# Patient Record
Sex: FEMALE | Race: BLACK OR AFRICAN AMERICAN
[De-identification: names, ages, dates, MRNs, and addresses within clinical notes are randomized per-mention and may not be internally consistent; named-entity substitution may affect disease eponyms.]

---

## 2018-06-05 ENCOUNTER — HOSPITAL ENCOUNTER (INPATIENT)
Dept: HOSPITAL 74 - YASAS | Age: 63
LOS: 3 days | Discharge: LEFT BEFORE DISCHARGE | DRG: 894 | End: 2018-06-08
Attending: SURGERY | Admitting: SURGERY
Payer: COMMERCIAL

## 2018-06-05 VITALS — BODY MASS INDEX: 18.5 KG/M2

## 2018-06-05 DIAGNOSIS — F10.230: Primary | ICD-10-CM

## 2018-06-05 DIAGNOSIS — F19.24: ICD-10-CM

## 2018-06-05 DIAGNOSIS — F12.20: ICD-10-CM

## 2018-06-05 DIAGNOSIS — I10: ICD-10-CM

## 2018-06-05 DIAGNOSIS — Z87.898: ICD-10-CM

## 2018-06-05 DIAGNOSIS — R64: ICD-10-CM

## 2018-06-05 DIAGNOSIS — F14.20: ICD-10-CM

## 2018-06-05 DIAGNOSIS — Z91.14: ICD-10-CM

## 2018-06-05 DIAGNOSIS — F32.9: ICD-10-CM

## 2018-06-05 DIAGNOSIS — I45.81: ICD-10-CM

## 2018-06-05 DIAGNOSIS — F17.210: ICD-10-CM

## 2018-06-05 PROCEDURE — HZ2ZZZZ DETOXIFICATION SERVICES FOR SUBSTANCE ABUSE TREATMENT: ICD-10-PCS | Performed by: SURGERY

## 2018-06-05 NOTE — HP
CIWA Score





- CIWA Score


Nausea/Vomitin-No Nausea/No Vomiting


Muscle Tremors: 3


Anxiety: 3


Agitation: 4-Moderately Restless


Paroxysmal Sweats: 3


Orientation: 3-Disoriented Date>2 days


Tacttile Disturbances: 0-None


Auditory Disturbances: 0-None


Visual Disturbances: 0-None


Headache: 3-Moderate


CIWA-Ar Total Score: 19





Admission ROS [facility]





- HPI


Chief Complaint: 





SEEKING DETOX FOR ALCOHOLISM WITH WITHDRAWAL SX'S


Allergies/Adverse Reactions: 


 Allergies











Allergy/AdvReac Type Severity Reaction Status Date / Time


 


No Known Allergies Allergy   Verified 18 19:48











History of Present Illness: 





63 Y.O. FEMLAE WITH LONG HX/O POLYSUBSTANCE ABUSE HERE FOR ALCOHOL DETOX. 

CLIENT IS KNOWN TO THIS PROGRAM. LAST HERE 3/2018. SHE IS SELF REFERRED. 

REPORTS LONGEST CLEAN TIME "ALONG TIME".  DENIES HX/O SEIZURE, SI/HI AND A/V 

HALLUCINATIONS. 


PMHX: HEAD TRAUMA,


PSYCH: 


CLIENT IS PRESENTLY A POOR HISTORIAN DUE TO INTOXICATION 


Exam Limitations: Intoxication





- Ebola screening


Have you traveled outside of the country in the last 21 days: No (N)


Have you had contact with anyone from an Ebola affected area: No


Have you been sick,other than usual withdrawal symptoms: No


Do you have a fever: No





- Review of Systems


Constitutional: Night Sweats


EENT: reports: No Symptoms Reported


Respiratory: reports: No Symptoms reported


Cardiac: reports: No Symptoms Reported


GI: reports: Poor Appetite, Poor Fluid Intake


: reports: Frequency


Musculoskeletal: reports: No Symptoms Reported


Integumentary: reports: No Symptoms Reported


Neuro: reports: No Symptoms reported


Endocrine: reports: No Symptoms Reported


Hematology: reports: No Symptoms Reported


Psychiatric: reports: Agitated, Depressed


Other Systems: Reviewed and Negative





Patient History





- Patient Medical History


Hx Anemia: No


Hx Asthma: No


Hx Chronic Obstructive Pulmonary Disease (COPD): No


Hx Cancer: No


Hx Cardiac Disorders: No


Hx Congestive Heart Failure: No


Hx Hypertension: Yes (Pt is non compliant with meds.)


Hx Hypercholesterolemia: No


Hx Pacemaker: No


HX Cerebrovascular Accident: No


Hx Seizures: No


Hx Diabetes: No


Hx Gastrointestinal Disorders: No


Hx Liver Disease: No


Hx Genitourinary Disorders: No


Hx Sexually Transmitted Disorders: No


Hx Renal Disease (ESRD): No


Hx Thyroid Disease: No


Hx Human Immunodeficiency Virus (HIV): No


Hx Hepatitis C: No


Hx Depression: Yes


Hx Suicide Attempt: No


Hx Bipolar Disorder: No


Hx Schizophrenia: No


Other Medical History: POOR HISTORIAN DUE TO INTOXICATION





- Patient Surgical History


Past Surgical History: Yes


Other Surgical History: 2 BRAIN SX


Anesthesia Reaction: No





- PPD History


Previous Implant?: Yes


Documented Results: Negative w/proof


Implanted On Prior [facility] Admission?: Yes


Date: 03/11/15


Results: 0MM


PPD to be Administered?: Yes





- Reproductive History


Patient is a Female of Child Bearing Age (11 -55 yrs old): No


Patient Pregnant: No ([facility])





- Smoking Cessation


Smoking history: Current every day smoker


Have you smoked in the past 12 months: Yes


Aproximately how many cigarettes per day: 10


Hx Chewing Tobacco Use: No


Initiated information on smoking cessation: Yes


'Breaking Loose' booklet given: 18





- Substance & Tx. History


Hx Alcohol Use: Yes


Hx Substance Use: Yes


Substance Use Type: Alcohol, Cocaine


Hx Substance Use Treatment: Yes ([facility])





- Substances Abused


  ** VODKA


Route: Oral


Frequency: Daily


Amount used: 1 PINT


Age of first use: 32


Date of Last Use: 18





  ** COCAINE


Route: Smoking


Frequency: 3-6 times per week


Amount used: $20


Age of first use: 32


Date of Last Use: 18





Family Disease History





- Family Disease History


Family History: Denies





Admission Physical Exam [facility]





- Vital Signs


Vital Signs: 


 Vital Signs - 24 hr











  18





  19:18


 


Temperature 98.7 F














- Physical


General Appearance: Yes: Appropriately Dressed, Moderate Distress, Alcohol on 

Breath, Intoxicated, Tremorous, Irritable, Other (CRYING)


HEENTM: Yes: EOMI, Normocephalic, BRIANNA, Pharynx Normal, Other (MISSING TEETH)


Respiratory: Yes: Chest Non-Tender, Lungs Clear, Normal Breath Sounds, No 

Respiratory Distress, No Accessory Muscle Use


Neck: Yes: No masses,lesions,Nodules, Supple, Trachea in good position


Breast: Yes: Breast Exam Deferred


Cardiology: Yes: Regular Rhythm, Regular Rate, S1, S2


Abdominal: Yes: Normal Bowel Sounds, Non Tender, Soft, Protuberent


Genitourinary: Yes: Frequency (REPORTS)


Back: Yes: Normal Inspection


Musculoskeletal: Yes: full range of Motion, Gait Steady


Extremities: Yes: Normal Capillary Refill, Normal Range of Motion, Tremors, 

Coldness


Neurological: Yes: Alert, Disoriented (DUE TO INTOXICATION), Depressed Affect


Integumentary: Yes: Cold, Moist


Lymphatic: Yes: Within Normal Limits





- Diagnostic


(1) Alcohol dependence with uncomplicated withdrawal


Current Visit: Yes   Status: Acute   





(2) Substance induced mood disorder


Current Visit: Yes   Status: Suspected   





(3) Cocaine dependence


Current Visit: Yes   Status: Chronic   





(4) HTN (hypertension)


Current Visit: Yes   Status: Chronic   





Cleared for Admission [facility]





- Detox or Rehab


[facility] Level of Care: Medically Managed


Detox Regimen/Protocol: Librium


Claeared for Rehab Admission: No





S Breath Alcohol Content


Breath Alcohol Content: 0.293





Urine Pregancy Test





- Result


Urine Pregnancy Test Results: Negative- NO Line Present





Urine Drug Screen





- Results


Drug Screen Negative: No


Urine Drug Screen Results: SUSANNE-Cocaine

## 2018-06-06 LAB
ALBUMIN SERPL-MCNC: 3.3 G/DL (ref 3.4–5)
ALP SERPL-CCNC: 59 U/L (ref 45–117)
ALT SERPL-CCNC: 27 U/L (ref 12–78)
ANION GAP SERPL CALC-SCNC: 8 MMOL/L (ref 8–16)
APPEARANCE UR: CLEAR
AST SERPL-CCNC: 30 U/L (ref 15–37)
BACTERIA #/AREA URNS HPF: (no result) /HPF
BASOPHILS # BLD: 2.2 % (ref 0–2)
BILIRUB SERPL-MCNC: 0.3 MG/DL (ref 0.2–1)
BILIRUB UR STRIP.AUTO-MCNC: NEGATIVE MG/DL
BUN SERPL-MCNC: 19 MG/DL (ref 7–18)
CALCIUM SERPL-MCNC: 8.2 MG/DL (ref 8.5–10.1)
CHLORIDE SERPL-SCNC: 104 MMOL/L (ref 98–107)
CO2 SERPL-SCNC: 30 MMOL/L (ref 21–32)
COLOR UR: (no result)
CREAT SERPL-MCNC: 1.3 MG/DL (ref 0.55–1.02)
DEPRECATED RDW RBC AUTO: 15.2 % (ref 11.6–15.6)
EOSINOPHIL # BLD: 4.1 % (ref 0–4.5)
EPITH CASTS URNS QL MICRO: (no result) /HPF
GLUCOSE SERPL-MCNC: 89 MG/DL (ref 74–106)
HCT VFR BLD CALC: 31.3 % (ref 32.4–45.2)
HGB BLD-MCNC: 10.4 GM/DL (ref 10.7–15.3)
KETONES UR QL STRIP: NEGATIVE
LEUKOCYTE ESTERASE UR QL STRIP.AUTO: NEGATIVE
LYMPHOCYTES # BLD: 46.6 % (ref 8–40)
MCH RBC QN AUTO: 28.9 PG (ref 25.7–33.7)
MCHC RBC AUTO-ENTMCNC: 33.1 G/DL (ref 32–36)
MCV RBC: 87.1 FL (ref 80–96)
MONOCYTES # BLD AUTO: 12.1 % (ref 3.8–10.2)
MUCOUS THREADS URNS QL MICRO: (no result)
NEUTROPHILS # BLD: 35 % (ref 42.8–82.8)
NITRITE UR QL STRIP: NEGATIVE
PH UR: 7 [PH] (ref 5–8)
PLATELET # BLD AUTO: 291 K/MM3 (ref 134–434)
PMV BLD: 7.1 FL (ref 7.5–11.1)
POTASSIUM SERPLBLD-SCNC: 3.6 MMOL/L (ref 3.5–5.1)
PROT SERPL-MCNC: 7.2 G/DL (ref 6.4–8.2)
PROT UR QL STRIP: (no result)
PROT UR QL STRIP: (no result)
RBC # BLD AUTO: 3.59 M/MM3 (ref 3.6–5.2)
RBC # UR STRIP: NEGATIVE /UL
SODIUM SERPL-SCNC: 142 MMOL/L (ref 136–145)
SP GR UR: 1.01 (ref 1–1.03)
UROBILINOGEN UR STRIP-MCNC: NEGATIVE MG/DL (ref 0.2–1)
WBC # BLD AUTO: 2.6 K/MM3 (ref 4–10)

## 2018-06-06 RX ADMIN — Medication SCH MG: at 22:16

## 2018-06-06 RX ADMIN — NICOTINE SCH: 14 PATCH, EXTENDED RELEASE TRANSDERMAL at 12:02

## 2018-06-06 RX ADMIN — Medication SCH: at 12:02

## 2018-06-06 RX ADMIN — Medication SCH: at 01:29

## 2018-06-06 NOTE — PN
[facility] CIWA





- CIWA Score


Nausea/Vomiting: 3


Muscle Tremors: 3


Anxiety: 3


Agitation: 2


Paroxysmal Sweats: 1-Minimal Palms Moist


Orientation: 0-Oriented


Tacttile Disturbances: 1-Very Mild Itch/Numbness


Auditory Disturbances: 1-Very Mild


Visual Disturbances: 0-None


Headache: 2-Mild


CIWA-Ar Total Score: 16





[facility]HS Progress Note (SOAP)


Subjective: 





alert,irritable,anxious,interrupted sleep,tremor


Objective: 





06/06/18 10:33


 Vital Signs











Temperature  98.2 F   06/06/18 09:33


 


Pulse Rate  93 H  06/06/18 09:33


 


Respiratory Rate  16   06/06/18 09:33


 


Blood Pressure  138/99   06/06/18 09:33


 


O2 Sat by Pulse Oximetry (%)      








ekg nsr 


peak t in v3,v4


prolong qt 430/480


no chest pain,no sob,no dizziness


labs pending


Assessment: 





06/06/18 10:36


withdrawal symptom


Plan: 





continue detox

## 2018-06-06 NOTE — EKG
Test Reason : 

Blood Pressure : ***/*** mmHG

Vent. Rate : 075 BPM     Atrial Rate : 075 BPM

   P-R Int : 172 ms          QRS Dur : 082 ms

    QT Int : 430 ms       P-R-T Axes : 072 013 056 degrees

   QTc Int : 480 ms

 

NORMAL SINUS RHYTHM

ANTEROSEPTAL INFARCT , AGE UNDETERMINED

ABNORMAL ECG

NO PREVIOUS ECGS AVAILABLE

Confirmed by MARIBEL ACEVEDO MD ([id number]) on 6/6/2018 11:53:13 AM

 

Referred By:             Confirmed By:MARIBEL ACEVEDO MD

## 2018-06-06 NOTE — CONSULT
[facility] Psychiatric Consult





- Data


Date of interview: 06/06/18


Admission source: [facility]


Identifying data: This is 63 years old female, divorsed, unemployed, homeless, 

on SSI, with a long history of Alcohol, Cocaine and Nicotine dependence,abuse i 

seeking for detox, selfreferred, reports withdrawal symptoms, poor historian.


Substance Abuse History: Smoking history: Current every day smoker.  Have you 

smoked in the past 12 months: Yes.  Aproximately how many cigarettes per day: 

10.  Hx Chewing Tobacco Use: No.  Initiated information on smoking cessation: 

Yes.  'Breaking Loose' booklet given: 06/05/18.  - Substance & Tx. History.  Hx 

Alcohol Use: Yes.  Hx Substance Use: Yes.  Substance Use Type: Alcohol, 

Cocaine.  Hx Substance Use Treatment: Yes ([facility]).  - Substances Abused.  ** 

VODKA.  Route: Oral.  Frequency: Daily.  Amount used: 1 PINT.  Age of first use

: 32.  Date of Last Use: 06/05/18.  ** COCAINE.  Route: Smoking.  Frequency: 3-

6 times per week.  Amount used: $20.  Age of first use: 32.  Date of Last Use: 

06/03/18


Medical History: History of head injury, HTN


Psychiatric History: Patient reports unclear psychiatric admission, unclear 

suicidal history, reports no medications taking prior to admission, sedated.


Physical/Sexual Abuse/Trauma History: Denies, unclear


Additional Comment: Observation.  Detox Unit Care Protocol





Mental Status Exam





- Mental Status Exam


Alert and Oriented to: Person


Cognitive Function: Fair


Patient Appearance: Unkempt


Mood: Sad


Affect: Flat


Patient Behavior: Sedated


Speech Pattern: Delayed


Voice Loudness: Moderately Soft/Quiet


Thought Process: Circumstantial


Thought Disorder: Being Controlled


Hallucinations: Denies


Suicidal Ideation: Denies


Homicidal Ideation: Denies


Insight/Judgement: Fair


Sleep: Fair


Appetite: Weight loss


Muscle strength/Tone: Moderate Hypotonicity


Gait/Station: Deferred


Additional Comments: Observation.  Detox Unit Care Protocol





Psychiatric Findings





- Problem List (Axis 1, 2,3)


(1) Alcohol dependence with uncomplicated withdrawal


Current Visit: Yes   Status: Acute   





(2) Cocaine dependence


Current Visit: Yes   Status: Chronic   





(3) Substance induced mood disorder


Current Visit: Yes   Status: Suspected   





(4) Alcohol dependence


Current Visit: No   Status: Acute   





(5) Cachexia


Current Visit: No   Status: Acute   





(6) Cannabis dependence


Current Visit: No   Status: Acute   





(7) Depressive disorder


Current Visit: No   Status: Acute   





- Initial Treatment Plan


Initial Treatment Plan: Observation.  Detox Unit Care Protocol

## 2018-06-07 RX ADMIN — Medication SCH MG: at 22:10

## 2018-06-07 RX ADMIN — Medication SCH TAB: at 11:20

## 2018-06-07 RX ADMIN — PANTOPRAZOLE SODIUM SCH MG: 20 TABLET, DELAYED RELEASE ORAL at 11:21

## 2018-06-07 RX ADMIN — METOPROLOL TARTRATE SCH MG: 25 TABLET, FILM COATED ORAL at 11:21

## 2018-06-07 RX ADMIN — METOPROLOL TARTRATE SCH MG: 25 TABLET, FILM COATED ORAL at 22:10

## 2018-06-07 RX ADMIN — NICOTINE SCH: 14 PATCH, EXTENDED RELEASE TRANSDERMAL at 11:21

## 2018-06-07 NOTE — PN
S CIWA





- CIWA Score


Nausea/Vomiting: 3


Muscle Tremors: 3


Anxiety: 2


Agitation: 2


Paroxysmal Sweats: 1-Minimal Palms Moist


Orientation: 0-Oriented


Tacttile Disturbances: 1-Very Mild Itch/Numbness


Auditory Disturbances: 1-Very Mild


Visual Disturbances: 0-None


Headache: 2-Mild


CIWA-Ar Total Score: 15





B[facility] Progress Note (SOAP)


Subjective: 





alert,irritable,anxious,interrupted sleep,tremor


Objective: 





06/07/18 08:53


 Vital Signs











Temperature  97.5 F L  06/07/18 06:00


 


Pulse Rate  70   06/07/18 06:00


 


Respiratory Rate  18   06/07/18 06:00


 


Blood Pressure  148/105   06/07/18 06:00


 


O2 Sat by Pulse Oximetry (%)      








 Laboratory Last Values











WBC  2.6 K/mm3 (4.0-10.0)  L D 06/06/18  07:30    


 


RBC  3.59 M/mm3 (3.60-5.2)  L  06/06/18  07:30    


 


Hgb  10.4 GM/dL (10.7-15.3)  L  06/06/18  07:30    


 


Hct  31.3 % (32.4-45.2)  L  06/06/18  07:30    


 


MCV  87.1 fl (80-96)   06/06/18  07:30    


 


MCH  28.9 pg (25.7-33.7)   06/06/18  07:30    


 


MCHC  33.1 g/dl (32.0-36.0)   06/06/18  07:30    


 


RDW  15.2 % (11.6-15.6)   06/06/18  07:30    


 


Plt Count  291 K/MM3 (134-434)  D 06/06/18  07:30    


 


MPV  7.1 fl (7.5-11.1)  L D 06/06/18  07:30    


 


Absolute Neuts (auto)  0.9 #  06/06/18  07:30    


 


Neutrophils %  35.0 % (42.8-82.8)  L  06/06/18  07:30    


 


Lymphocytes %  46.6 % (8-40)  H  06/06/18  07:30    


 


Monocytes %  12.1 % (3.8-10.2)  H  06/06/18  07:30    


 


Eosinophils %  4.1 % (0-4.5)   06/06/18  07:30    


 


Basophils %  2.2 % (0-2.0)  H  06/06/18  07:30    


 


Nucleated RBC %  0 % (0-0)   06/06/18  07:30    


 


Sodium  142 mmol/L (136-145)   06/06/18  07:30    


 


Potassium  3.6 mmol/L (3.5-5.1)   06/06/18  07:30    


 


Chloride  104 mmol/L ()   06/06/18  07:30    


 


Carbon Dioxide  30 mmol/L (21-32)   06/06/18  07:30    


 


Anion Gap  8  (8-16)   06/06/18  07:30    


 


BUN  19 mg/dL (7-18)  H  06/06/18  07:30    


 


Creatinine  1.3 mg/dL (0.55-1.02)  H  06/06/18  07:30    


 


Creat Clearance w eGFR  41.64  (>60)   06/06/18  07:30    


 


Random Glucose  89 mg/dL ()   06/06/18  07:30    


 


Calcium  8.2 mg/dL (8.5-10.1)  L  06/06/18  07:30    


 


Total Bilirubin  0.3 mg/dL (0.2-1.0)  D 06/06/18  07:30    


 


AST  30 U/L (15-37)   06/06/18  07:30    


 


ALT  27 U/L (12-78)   06/06/18  07:30    


 


Alkaline Phosphatase  59 U/L ()   06/06/18  07:30    


 


Total Protein  7.2 g/dl (6.4-8.2)   06/06/18  07:30    


 


Albumin  3.3 g/dl (3.4-5.0)  L  06/06/18  07:30    


 


Urine Color  Ltyellow   06/06/18  15:00    


 


Urine Appearance  Clear   06/06/18  15:00    


 


Urine pH  7.0  (5.0-8.0)  D 06/06/18  15:00    


 


Ur Specific Gravity  1.011  (1.001-1.035)   06/06/18  15:00    


 


Urine Protein  1+  (NEGATIVE)  H  06/06/18  15:00    


 


Urine Glucose (UA)  2+  (NEGATIVE)  H  06/06/18  15:00    


 


Urine Ketones  Negative  (NEGATIVE)   06/06/18  15:00    


 


Urine Blood  Negative  (NEGATIVE)   06/06/18  15:00    


 


Urine Nitrite  Negative  (NEGATIVE)   06/06/18  15:00    


 


Urine Bilirubin  Negative  (<2.0 mg/dL)   06/06/18  15:00    


 


Urine Urobilinogen  Negative mg/dL (0.2-1.0)   06/06/18  15:00    


 


Ur Leukocyte Esterase  Negative  (NEGATIVE)   06/06/18  15:00    


 


Urine WBC (Auto)  1 /hpf (3-5)   06/06/18  15:00    


 


Urine RBC (Auto)  <1 /hpf (0-3)   06/06/18  15:00    


 


Ur Epithelial Cells  Rare /HPF (FEW)   06/06/18  15:00    


 


Urine Bacteria  Rare /hpf (NONE SEEN)   06/06/18  15:00    


 


Urine Mucus  Rare   06/06/18  15:00    


 


RPR Titer  Nonreactive  (NONREACTIVE)   06/06/18  07:30    











Assessment: 





06/07/18 08:56


withdrawal symptom


Plan: 





continue detox,encourage oral fluid,cbc,cmp in am

## 2018-06-08 VITALS — HEART RATE: 69 BPM | TEMPERATURE: 97 F | DIASTOLIC BLOOD PRESSURE: 92 MMHG | SYSTOLIC BLOOD PRESSURE: 147 MMHG

## 2018-06-08 LAB
ALBUMIN SERPL-MCNC: 3 G/DL (ref 3.4–5)
ALP SERPL-CCNC: 58 U/L (ref 45–117)
ALT SERPL-CCNC: 25 U/L (ref 12–78)
ANION GAP SERPL CALC-SCNC: 5 MMOL/L (ref 8–16)
AST SERPL-CCNC: 23 U/L (ref 15–37)
BILIRUB SERPL-MCNC: 0.3 MG/DL (ref 0.2–1)
BUN SERPL-MCNC: 25 MG/DL (ref 7–18)
CALCIUM SERPL-MCNC: 9 MG/DL (ref 8.5–10.1)
CHLORIDE SERPL-SCNC: 100 MMOL/L (ref 98–107)
CO2 SERPL-SCNC: 30 MMOL/L (ref 21–32)
CREAT SERPL-MCNC: 1.4 MG/DL (ref 0.55–1.02)
DEPRECATED RDW RBC AUTO: 15.3 % (ref 11.6–15.6)
GLUCOSE SERPL-MCNC: 96 MG/DL (ref 74–106)
HCT VFR BLD CALC: 32 % (ref 32.4–45.2)
HGB BLD-MCNC: 10.5 GM/DL (ref 10.7–15.3)
MCH RBC QN AUTO: 28.8 PG (ref 25.7–33.7)
MCHC RBC AUTO-ENTMCNC: 32.9 G/DL (ref 32–36)
MCV RBC: 87.5 FL (ref 80–96)
PLATELET # BLD AUTO: 299 K/MM3 (ref 134–434)
PMV BLD: 7.2 FL (ref 7.5–11.1)
POTASSIUM SERPLBLD-SCNC: 4.2 MMOL/L (ref 3.5–5.1)
PROT SERPL-MCNC: 6.8 G/DL (ref 6.4–8.2)
RBC # BLD AUTO: 3.66 M/MM3 (ref 3.6–5.2)
SODIUM SERPL-SCNC: 135 MMOL/L (ref 136–145)
WBC # BLD AUTO: 3 K/MM3 (ref 4–10)

## 2018-06-08 RX ADMIN — Medication SCH TAB: at 10:30

## 2018-06-08 RX ADMIN — PANTOPRAZOLE SODIUM SCH MG: 20 TABLET, DELAYED RELEASE ORAL at 10:31

## 2018-06-08 RX ADMIN — METOPROLOL TARTRATE SCH MG: 25 TABLET, FILM COATED ORAL at 10:30

## 2018-06-08 RX ADMIN — NICOTINE SCH: 14 PATCH, EXTENDED RELEASE TRANSDERMAL at 10:31

## 2018-06-08 NOTE — PN
[facility] Progress Note (SOAP)


Subjective: 





alert,irritable,anxious,interrupted sleep,pain in the body


Objective: 





06/08/18 11:21


 Vital Signs











Temperature  96.6 F L  06/08/18 06:00


 


Pulse Rate  63   06/08/18 07:15


 


Respiratory Rate  18   06/08/18 07:15


 


Blood Pressure  137/92   06/08/18 07:15


 


O2 Sat by Pulse Oximetry (%)      








 Laboratory Results - last 24 hr











  06/08/18 06/08/18





  07:30 07:30


 


WBC  3.0 L 


 


RBC  3.66 


 


Hgb  10.5 L 


 


Hct  32.0 L 


 


MCV  87.5 


 


MCH  28.8 


 


MCHC  32.9 


 


RDW  15.3 


 


Plt Count  299 


 


MPV  7.2 L 


 


Sodium   135 L


 


Potassium   4.2


 


Chloride   100











Assessment: 





06/08/18 11:22


withdrawal symptom


labs pending


Plan: 





continue detox

## 2018-06-08 NOTE — DS
[facility] Detox Discharge Summary


Admission Date: 


06/05/18





Discharge Date: 06/08/18





- History


Present History: Alcohol Dependence, Cannabis Dependence, Cocaine Dependence


Additional Comments: 





PATIENT DOES NOT WISH TO STAY TO COMPLETE DETOX REGIMEN. RISKS OF LEAVING DETOX 

UNIT AGAINST MEDICAL ADVICE AND PRIOR TO COMPLETION OF DETOX REGIMEN EXPLAINED 

TO PATIENT. PATIENT DECLINED OFFER OF DISCHARGE MEDICATION PRESCRIPTIONS FOR 

HOME MEDICATIONS, NOTING THAT SHE CURRENTLY HAS ADEQUATE SUPPLIES AT HOME. 

PATIENT ADVISED TO GO IMMEDIATELY TO NEAREST ER SHOULD ANY INTOLERABLE DETOX 

SYMPTOMS DEVELOP AT ANY TIME. PATIENT LEFT DETOX UNIT IN STABLE MEDICAL 

CONDITION.


Pertinent Past History: 





Depression, Cachexia, Weight Decreased, HTN.





- Physical Exam Results


Vital Signs: 


 Vital Signs











Temperature  97.0 F L  06/08/18 17:43


 


Pulse Rate  69   06/08/18 17:43


 


Respiratory Rate  18   06/08/18 17:43


 


Blood Pressure  147/92   06/08/18 17:43


 


O2 Sat by Pulse Oximetry (%)      











Pertinent Admission Physical Exam Findings: 





WITHDRAWAL SYMPTOMS.





 Laboratory Tests











  06/06/18 06/06/18 06/06/18





  07:30 07:30 07:30


 


WBC    2.6 L D


 


RBC    3.59 L


 


Hgb    10.4 L


 


Hct    31.3 L


 


MCV    87.1


 


MCH    28.9


 


MCHC    33.1


 


RDW    15.2


 


Plt Count    291  D


 


MPV    7.1 L D


 


Absolute Neuts (auto)    0.9


 


Neutrophils %    35.0 L


 


Lymphocytes %    46.6 H


 


Monocytes %    12.1 H


 


Eosinophils %    4.1


 


Basophils %    2.2 H


 


Nucleated RBC %    0


 


Sodium  142  


 


Potassium  3.6  


 


Chloride  104  


 


Carbon Dioxide  30  


 


Anion Gap  8  


 


BUN  19 H  


 


Creatinine  1.3 H  


 


Creat Clearance w eGFR  41.64  


 


Random Glucose  89  


 


Calcium  8.2 L  


 


Total Bilirubin  0.3  D  


 


AST  30  


 


ALT  27  


 


Alkaline Phosphatase  59  


 


Total Protein  7.2  


 


Albumin  3.3 L  


 


Urine Color   


 


Urine Appearance   


 


Urine pH   


 


Ur Specific Gravity   


 


Urine Protein   


 


Urine Glucose (UA)   


 


Urine Ketones   


 


Urine Blood   


 


Urine Nitrite   


 


Urine Bilirubin   


 


Urine Urobilinogen   


 


Ur Leukocyte Esterase   


 


Urine WBC (Auto)   


 


Urine RBC (Auto)   


 


Ur Epithelial Cells   


 


Urine Bacteria   


 


Urine Mucus   


 


RPR Titer   Nonreactive 














  06/06/18 06/08/18 06/08/18





  15:00 07:30 07:30


 


WBC   3.0 L 


 


RBC   3.66 


 


Hgb   10.5 L 


 


Hct   32.0 L 


 


MCV   87.5 


 


MCH   28.8 


 


MCHC   32.9 


 


RDW   15.3 


 


Plt Count   299 


 


MPV   7.2 L 


 


Absolute Neuts (auto)   


 


Neutrophils %   


 


Lymphocytes %   


 


Monocytes %   


 


Eosinophils %   


 


Basophils %   


 


Nucleated RBC %   


 


Sodium    135 L


 


Potassium    4.2


 


Chloride    100


 


Carbon Dioxide    30


 


Anion Gap    5 L


 


BUN    25 H


 


Creatinine    1.4 H


 


Creat Clearance w eGFR    38.23


 


Random Glucose    96


 


Calcium    9.0


 


Total Bilirubin    0.3


 


AST    23


 


ALT    25


 


Alkaline Phosphatase    58


 


Total Protein    6.8


 


Albumin    3.0 L


 


Urine Color  Ltyellow  


 


Urine Appearance  Clear  


 


Urine pH  7.0  D  


 


Ur Specific [place]  1.011  


 


Urine Protein  1+ H  


 


Urine Glucose (UA)  2+ H  


 


Urine Ketones  Negative  


 


Urine Blood  Negative  


 


Urine Nitrite  Negative  


 


Urine Bilirubin  Negative  


 


Urine Urobilinogen  Negative  


 


Ur Leukocyte Esterase  Negative  


 


Urine WBC (Auto)  1  


 


Urine RBC (Auto)  <1  


 


Ur Epithelial Cells  Rare  


 


Urine Bacteria  Rare  


 


Urine Mucus  Rare  


 


RPR Titer   








LABS NOTED.





- Treatment


Hospital Course: Detoxed Safely





- Medication


Discharge Medications: 


Ambulatory Orders





Metoprolol Tartrate [Lopressor -] 25 mg PO BID #60 tablet 03/13/15 


Pantoprazole Sodium [Protonix -] 20 mg PO DAILY #30 tablet.ec 03/13/15 


Thiamine HCl [Vitamin B1 -] 100 mg PO HS #30 tablet 03/13/15 











- Diagnosis


(1) Alcohol dependence with uncomplicated withdrawal


Current Visit: Yes   Status: Acute   





(2) Cocaine dependence


Current Visit: Yes   Status: Chronic   


Qualifiers: 


   Substance use status: uncomplicated   Qualified Code(s): F14.20 - Cocaine 

dependence, uncomplicated   





(3) HTN (hypertension)


Current Visit: Yes   Status: Chronic   


Qualifiers: 


   Hypertension type: unspecified   Qualified Code(s): I10 - Essential (primary

) hypertension   





(4) Substance induced mood disorder


Current Visit: Yes   Status: Suspected   





(5) Cachexia


Current Visit: Yes   Status: Acute   





(6) Cannabis dependence


Current Visit: Yes   Status: Acute   





(7) Depressive disorder


Current Visit: Yes   Status: Acute   





(8) History of hypertension


Current Visit: Yes   Status: Acute   





(9) Weight decreased


Current Visit: Yes   Status: Acute   





- AMA


Did Patient Leave Against Medical Advice: Yes (PATIENT DID NOT WISH TO STAY TO 

COMPLETE DETOX REGIMEN.)

## 2021-05-19 ENCOUNTER — HOSPITAL ENCOUNTER (INPATIENT)
Dept: HOSPITAL 74 - YASAS | Age: 66
LOS: 4 days | Discharge: HOME | DRG: 897 | End: 2021-05-23
Attending: ALLERGY & IMMUNOLOGY | Admitting: ALLERGY & IMMUNOLOGY
Payer: COMMERCIAL

## 2021-05-19 VITALS — BODY MASS INDEX: 17.9 KG/M2

## 2021-05-19 DIAGNOSIS — R26.89: ICD-10-CM

## 2021-05-19 DIAGNOSIS — F17.210: ICD-10-CM

## 2021-05-19 DIAGNOSIS — R41.3: ICD-10-CM

## 2021-05-19 DIAGNOSIS — F14.20: ICD-10-CM

## 2021-05-19 DIAGNOSIS — N18.9: ICD-10-CM

## 2021-05-19 DIAGNOSIS — I12.9: ICD-10-CM

## 2021-05-19 DIAGNOSIS — F10.230: Primary | ICD-10-CM

## 2021-05-19 LAB
ALBUMIN SERPL-MCNC: 4 G/DL (ref 3.4–5)
ALP SERPL-CCNC: 71 U/L (ref 45–117)
ALT SERPL-CCNC: 19 U/L (ref 13–61)
ANION GAP SERPL CALC-SCNC: 9 MMOL/L (ref 8–16)
AST SERPL-CCNC: 30 U/L (ref 15–37)
BILIRUB SERPL-MCNC: 0.6 MG/DL (ref 0.2–1)
BUN SERPL-MCNC: 31.5 MG/DL (ref 7–18)
CALCIUM SERPL-MCNC: 10.1 MG/DL (ref 8.5–10.1)
CHLORIDE SERPL-SCNC: 100 MMOL/L (ref 98–107)
CO2 SERPL-SCNC: 27 MMOL/L (ref 21–32)
CREAT SERPL-MCNC: 2.3 MG/DL (ref 0.55–1.3)
DEPRECATED RDW RBC AUTO: 18 % (ref 11.6–15.6)
GLUCOSE SERPL-MCNC: 94 MG/DL (ref 74–106)
HCT VFR BLD CALC: 33.6 % (ref 32.4–45.2)
HGB BLD-MCNC: 11 GM/DL (ref 10.7–15.3)
MCH RBC QN AUTO: 28 PG (ref 25.7–33.7)
MCHC RBC AUTO-ENTMCNC: 32.8 G/DL (ref 32–36)
MCV RBC: 85.5 FL (ref 80–96)
PLATELET # BLD AUTO: 354 K/MM3 (ref 134–434)
PMV BLD: 8.2 FL (ref 7.5–11.1)
PROT SERPL-MCNC: 8.2 G/DL (ref 6.4–8.2)
RBC # BLD AUTO: 3.93 M/MM3 (ref 3.6–5.2)
SODIUM SERPL-SCNC: 136 MMOL/L (ref 136–145)
WBC # BLD AUTO: 3.7 K/MM3 (ref 4–10)

## 2021-05-19 PROCEDURE — HZ2ZZZZ DETOXIFICATION SERVICES FOR SUBSTANCE ABUSE TREATMENT: ICD-10-PCS | Performed by: ALLERGY & IMMUNOLOGY

## 2021-05-19 PROCEDURE — U0005 INFEC AGEN DETEC AMPLI PROBE: HCPCS

## 2021-05-19 PROCEDURE — U0003 INFECTIOUS AGENT DETECTION BY NUCLEIC ACID (DNA OR RNA); SEVERE ACUTE RESPIRATORY SYNDROME CORONAVIRUS 2 (SARS-COV-2) (CORONAVIRUS DISEASE [COVID-19]), AMPLIFIED PROBE TECHNIQUE, MAKING USE OF HIGH THROUGHPUT TECHNOLOGIES AS DESCRIBED BY CMS-2020-01-R: HCPCS

## 2021-05-19 PROCEDURE — C9803 HOPD COVID-19 SPEC COLLECT: HCPCS

## 2021-05-19 RX ADMIN — PANTOPRAZOLE SODIUM SCH MG: 40 TABLET, DELAYED RELEASE ORAL at 22:27

## 2021-05-19 RX ADMIN — HYDROXYZINE PAMOATE SCH: 25 CAPSULE ORAL at 22:30

## 2021-05-19 RX ADMIN — Medication SCH MG: at 22:26

## 2021-05-19 RX ADMIN — NICOTINE SCH MG: 7 PATCH TRANSDERMAL at 15:18

## 2021-05-19 RX ADMIN — HYDROXYZINE PAMOATE SCH MG: 25 CAPSULE ORAL at 15:18

## 2021-05-19 RX ADMIN — HYDROXYZINE PAMOATE SCH MG: 25 CAPSULE ORAL at 17:47

## 2021-05-19 RX ADMIN — METOPROLOL TARTRATE SCH MG: 25 TABLET, FILM COATED ORAL at 22:27

## 2021-05-20 RX ADMIN — PANTOPRAZOLE SODIUM SCH MG: 40 TABLET, DELAYED RELEASE ORAL at 10:40

## 2021-05-20 RX ADMIN — NICOTINE SCH: 7 PATCH TRANSDERMAL at 10:41

## 2021-05-20 RX ADMIN — HYDROXYZINE PAMOATE SCH: 25 CAPSULE ORAL at 10:41

## 2021-05-20 RX ADMIN — HYDROXYZINE PAMOATE SCH MG: 25 CAPSULE ORAL at 06:43

## 2021-05-20 RX ADMIN — METOPROLOL TARTRATE SCH MG: 25 TABLET, FILM COATED ORAL at 22:27

## 2021-05-20 RX ADMIN — HYDROXYZINE PAMOATE SCH: 25 CAPSULE ORAL at 13:07

## 2021-05-20 RX ADMIN — Medication SCH MG: at 22:27

## 2021-05-20 RX ADMIN — Medication SCH TAB: at 10:40

## 2021-05-20 RX ADMIN — METOPROLOL TARTRATE SCH MG: 25 TABLET, FILM COATED ORAL at 10:41

## 2021-05-21 RX ADMIN — NICOTINE SCH: 7 PATCH TRANSDERMAL at 10:52

## 2021-05-21 RX ADMIN — PANTOPRAZOLE SODIUM SCH MG: 20 TABLET, DELAYED RELEASE ORAL at 10:51

## 2021-05-21 RX ADMIN — METOPROLOL TARTRATE SCH MG: 25 TABLET, FILM COATED ORAL at 10:51

## 2021-05-21 RX ADMIN — Medication SCH MG: at 22:13

## 2021-05-21 RX ADMIN — Medication SCH TAB: at 10:51

## 2021-05-21 RX ADMIN — METOPROLOL TARTRATE SCH MG: 25 TABLET, FILM COATED ORAL at 22:13

## 2021-05-22 RX ADMIN — Medication SCH TAB: at 10:24

## 2021-05-22 RX ADMIN — HYDROXYZINE PAMOATE PRN MG: 25 CAPSULE ORAL at 22:58

## 2021-05-22 RX ADMIN — Medication SCH MG: at 22:59

## 2021-05-22 RX ADMIN — METOPROLOL TARTRATE SCH MG: 25 TABLET, FILM COATED ORAL at 10:25

## 2021-05-22 RX ADMIN — NICOTINE SCH: 7 PATCH TRANSDERMAL at 10:24

## 2021-05-22 RX ADMIN — HYDROXYZINE PAMOATE PRN MG: 25 CAPSULE ORAL at 17:59

## 2021-05-22 RX ADMIN — PANTOPRAZOLE SODIUM SCH MG: 20 TABLET, DELAYED RELEASE ORAL at 10:25

## 2021-05-22 RX ADMIN — METOPROLOL TARTRATE SCH MG: 25 TABLET, FILM COATED ORAL at 22:58

## 2021-05-22 RX ADMIN — Medication SCH MG: at 22:58

## 2021-05-23 VITALS — HEART RATE: 79 BPM | DIASTOLIC BLOOD PRESSURE: 67 MMHG | SYSTOLIC BLOOD PRESSURE: 128 MMHG | TEMPERATURE: 96.4 F

## 2021-05-23 RX ADMIN — METOPROLOL TARTRATE SCH MG: 25 TABLET, FILM COATED ORAL at 09:35

## 2021-05-23 RX ADMIN — NICOTINE SCH: 7 PATCH TRANSDERMAL at 09:35

## 2021-05-23 RX ADMIN — PANTOPRAZOLE SODIUM SCH MG: 20 TABLET, DELAYED RELEASE ORAL at 09:35

## 2021-05-23 RX ADMIN — Medication SCH TAB: at 09:35

## 2022-05-30 ENCOUNTER — OUT OF OFFICE VISIT (OUTPATIENT)
Dept: URBAN - METROPOLITAN AREA MEDICAL CENTER 39 | Facility: MEDICAL CENTER | Age: 67
End: 2022-05-30
Payer: SELF-PAY

## 2022-05-30 DIAGNOSIS — D64.89 ANEMIA DUE TO OTHER CAUSE: ICD-10-CM

## 2022-05-30 DIAGNOSIS — R19.5 ABNORMAL CONSISTENCY OF STOOL: ICD-10-CM

## 2022-05-30 PROCEDURE — 99255 IP/OBS CONSLTJ NEW/EST HI 80: CPT | Performed by: INTERNAL MEDICINE

## 2022-05-31 ENCOUNTER — OUT OF OFFICE VISIT (OUTPATIENT)
Dept: URBAN - METROPOLITAN AREA MEDICAL CENTER 39 | Facility: MEDICAL CENTER | Age: 67
End: 2022-05-31
Payer: SELF-PAY

## 2022-05-31 DIAGNOSIS — D64.89 ANEMIA DUE TO OTHER CAUSE: ICD-10-CM

## 2022-05-31 PROCEDURE — 99233 SBSQ HOSP IP/OBS HIGH 50: CPT | Performed by: INTERNAL MEDICINE

## 2023-08-13 ENCOUNTER — HOSPITAL ENCOUNTER (EMERGENCY)
Dept: HOSPITAL 74 - JER | Age: 68
Discharge: HOME | End: 2023-08-13
Payer: COMMERCIAL

## 2023-08-13 VITALS
DIASTOLIC BLOOD PRESSURE: 99 MMHG | TEMPERATURE: 98 F | RESPIRATION RATE: 16 BRPM | SYSTOLIC BLOOD PRESSURE: 190 MMHG | HEART RATE: 82 BPM

## 2023-08-13 VITALS — BODY MASS INDEX: 22.3 KG/M2

## 2023-08-13 DIAGNOSIS — S09.90XA: Primary | ICD-10-CM
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
## 2023-08-13 DIAGNOSIS — I10: ICD-10-CM

## 2023-08-13 DIAGNOSIS — R51.9: ICD-10-CM

## 2023-08-13 DIAGNOSIS — R53.83: ICD-10-CM

## 2023-08-13 DIAGNOSIS — R41.82: ICD-10-CM

## 2023-08-13 DIAGNOSIS — E16.2: ICD-10-CM

## 2023-08-13 DIAGNOSIS — R40.0: ICD-10-CM

## 2023-08-13 LAB
ALBUMIN SERPL-MCNC: 3 G/DL (ref 3.4–5)
ALP SERPL-CCNC: 77 U/L (ref 45–117)
ALT SERPL-CCNC: 18 U/L (ref 13–61)
ANION GAP SERPL CALC-SCNC: 5 MMOL/L (ref 8–16)
AST SERPL-CCNC: 20 U/L (ref 15–37)
BASE EXCESS BLDV CALC-SCNC: 1.8 MMOL/L (ref -2–2)
BASOPHILS # BLD: 1.2 % (ref 0–2)
BILIRUB SERPL-MCNC: 0.3 MG/DL (ref 0.2–1)
BUN SERPL-MCNC: 28.5 MG/DL (ref 7–18)
CALCIUM SERPL-MCNC: 8.8 MG/DL (ref 8.5–10.1)
CHLORIDE SERPL-SCNC: 108 MMOL/L (ref 98–107)
CO2 SERPL-SCNC: 30 MMOL/L (ref 21–32)
CREAT SERPL-MCNC: 2.5 MG/DL (ref 0.55–1.3)
DEPRECATED RDW RBC AUTO: 18.8 % (ref 11.6–15.6)
EOSINOPHIL # BLD: 2.4 % (ref 0–4.5)
GLUCOSE SERPL-MCNC: 115 MG/DL (ref 74–106)
HCT VFR BLD CALC: 32.4 % (ref 32.4–45.2)
HCT VFR BLDV CALC: 31 % (ref 32.4–45.2)
HGB BLD-MCNC: 10.5 GM/DL (ref 10.7–15.3)
LYMPHOCYTES # BLD: 23.5 % (ref 8–40)
MAGNESIUM SERPL-MCNC: 1.9 MG/DL (ref 1.8–2.4)
MCH RBC QN AUTO: 27.4 PG (ref 25.7–33.7)
MCHC RBC AUTO-ENTMCNC: 32.4 G/DL (ref 32–36)
MCV RBC: 84.8 FL (ref 80–96)
MONOCYTES # BLD AUTO: 8.2 % (ref 3.8–10.2)
NEUTROPHILS # BLD: 64.7 % (ref 42.8–82.8)
PCO2 BLDV: 50.8 MMHG (ref 38–52)
PH BLDV: 7.36 [PH] (ref 7.31–7.41)
PHOSPHATE SERPL-MCNC: 3.6 MG/DL (ref 2.5–4.9)
PLATELET # BLD AUTO: 379 10^3/UL (ref 134–434)
PMV BLD: 8.2 FL (ref 7.5–11.1)
POTASSIUM SERPLBLD-SCNC: 4.2 MMOL/L (ref 3.5–5.1)
PROT SERPL-MCNC: 6.8 G/DL (ref 6.4–8.2)
RBC # BLD AUTO: 3.82 M/MM3 (ref 3.6–5.2)
SAO2 % BLDV: 50.5 % (ref 70–80)
SODIUM SERPL-SCNC: 143 MMOL/L (ref 136–145)
WBC # BLD AUTO: 8 K/MM3 (ref 4–10)

## 2024-08-29 ENCOUNTER — HOSPITAL ENCOUNTER (INPATIENT)
Dept: HOSPITAL 74 - YASAS | Age: 69
LOS: 5 days | Discharge: HOME | DRG: 895 | End: 2024-09-03
Attending: PSYCHIATRY & NEUROLOGY | Admitting: PSYCHIATRY & NEUROLOGY
Payer: COMMERCIAL

## 2024-08-29 VITALS — BODY MASS INDEX: 14.1 KG/M2

## 2024-08-29 DIAGNOSIS — F10.20: Primary | ICD-10-CM

## 2024-08-29 DIAGNOSIS — N18.4: ICD-10-CM

## 2024-08-29 DIAGNOSIS — F19.24: ICD-10-CM

## 2024-08-29 DIAGNOSIS — I12.9: ICD-10-CM

## 2024-08-29 DIAGNOSIS — F14.20: ICD-10-CM

## 2024-08-29 DIAGNOSIS — F32.A: ICD-10-CM

## 2024-08-29 DIAGNOSIS — D64.9: ICD-10-CM

## 2024-08-29 DIAGNOSIS — F17.210: ICD-10-CM

## 2024-08-29 LAB
APPEARANCE UR: CLEAR
BILIRUB UR STRIP.AUTO-MCNC: NEGATIVE MG/DL
COLOR UR: YELLOW
KETONES UR QL STRIP: NEGATIVE
LEUKOCYTE ESTERASE UR QL STRIP.AUTO: NEGATIVE
NITRITE UR QL STRIP: NEGATIVE
PH UR: 5.5 [PH] (ref 5–8)
PROT UR QL STRIP: 300
PROT UR QL STRIP: NEGATIVE
SP GR UR: 1.01 (ref 1.01–1.03)
UROBILINOGEN UR STRIP-MCNC: 0.2 MG/DL (ref 0.2–1)

## 2024-08-29 PROCEDURE — HZ42ZZZ GROUP COUNSELING FOR SUBSTANCE ABUSE TREATMENT, COGNITIVE-BEHAVIORAL: ICD-10-PCS | Performed by: PSYCHIATRY & NEUROLOGY

## 2024-08-29 RX ADMIN — Medication SCH MG: at 21:30

## 2024-08-29 RX ADMIN — TUBERCULIN PURIFIED PROTEIN DERIVATIVE ONE TU: 5 INJECTION, SOLUTION INTRADERMAL at 21:30

## 2024-08-29 RX ADMIN — ASPIRIN SCH MG: 81 TABLET, COATED ORAL at 19:28

## 2024-08-29 RX ADMIN — ACETAMINOPHEN PRN MG: 325 TABLET ORAL at 20:03

## 2024-08-29 RX ADMIN — IBUPROFEN PRN MG: 600 TABLET, FILM COATED ORAL at 22:48

## 2024-08-29 RX ADMIN — AMOXICILLIN SCH MG: 500 CAPSULE ORAL at 21:30

## 2024-08-29 RX ADMIN — BENZOCAINE PRN APPLIC: 200 GEL TOPICAL at 22:45

## 2024-08-30 LAB
ALBUMIN SERPL-MCNC: 3 G/DL (ref 3.4–5)
ALP SERPL-CCNC: 74 U/L (ref 45–117)
ALT SERPL-CCNC: 16 U/L (ref 13–61)
ANION GAP SERPL CALC-SCNC: 8 MMOL/L (ref 4–13)
AST SERPL-CCNC: 23 U/L (ref 15–37)
BILIRUB SERPL-MCNC: 0.5 MG/DL (ref 0.2–1)
BUN SERPL-MCNC: 49.6 MG/DL (ref 7–18)
CALCIUM SERPL-MCNC: 8.5 MG/DL (ref 8.5–10.1)
CHLORIDE SERPL-SCNC: 107 MMOL/L (ref 98–107)
CO2 SERPL-SCNC: 24 MMOL/L (ref 21–32)
CREAT SERPL-MCNC: 2.9 MG/DL (ref 0.55–1.3)
DEPRECATED RDW RBC AUTO: 21.5 % (ref 11.6–15.6)
GLUCOSE SERPL-MCNC: 88 MG/DL (ref 74–106)
HCT VFR BLD CALC: 28.3 % (ref 32.4–45.2)
HGB BLD-MCNC: 9.2 GM/DL (ref 10.7–15.3)
MCH RBC QN AUTO: 26.8 PG (ref 25.7–33.7)
MCHC RBC AUTO-ENTMCNC: 32.4 G/DL (ref 32–36)
MCV RBC: 82.9 FL (ref 80–96)
PLATELET # BLD AUTO: 431 10^3/UL (ref 134–434)
PMV BLD: 7.5 FL (ref 7.5–11.1)
POTASSIUM SERPLBLD-SCNC: 4.4 MMOL/L (ref 3.5–5.1)
PROT SERPL-MCNC: 6.5 G/DL (ref 6.4–8.2)
RBC # BLD AUTO: 3.42 M/MM3 (ref 3.6–5.2)
SODIUM SERPL-SCNC: 139 MMOL/L (ref 136–145)
TREPONEMA PALLIDUM AB [UNITS/VOLUME] IN SERUM OR PLASMA BY IMMUNOASSAY: (no result)
WBC # BLD AUTO: 6.8 K/MM3 (ref 4–10)

## 2024-08-30 RX ADMIN — AMLODIPINE BESYLATE SCH MG: 10 TABLET ORAL at 09:33

## 2024-08-30 RX ADMIN — Medication SCH TAB: at 09:33

## 2024-08-31 VITALS — RESPIRATION RATE: 18 BRPM

## 2024-08-31 RX ADMIN — Medication PRN MG: at 21:40

## 2024-08-31 RX ADMIN — LIDOCAINE PRN PATCH: 50 PATCH TOPICAL at 10:44

## 2024-08-31 RX ADMIN — FUROSEMIDE SCH MG: 20 TABLET ORAL at 10:37

## 2024-08-31 RX ADMIN — LISINOPRIL SCH MG: 10 TABLET ORAL at 10:37

## 2024-08-31 RX ADMIN — Medication SCH EACH: at 21:40

## 2024-08-31 RX ADMIN — FERROUS SULFATE TAB EC 324 MG (65 MG FE EQUIVALENT) SCH MG: 324 (65 FE) TABLET DELAYED RESPONSE at 17:43

## 2024-08-31 RX ADMIN — POTASSIUM CHLORIDE SCH: 750 TABLET, EXTENDED RELEASE ORAL at 10:38

## 2024-09-01 RX ADMIN — POTASSIUM CHLORIDE SCH MEQ: 1500 TABLET, EXTENDED RELEASE ORAL at 09:39

## 2024-09-03 VITALS — HEART RATE: 113 BPM | SYSTOLIC BLOOD PRESSURE: 134 MMHG | DIASTOLIC BLOOD PRESSURE: 86 MMHG

## 2024-09-03 VITALS — TEMPERATURE: 97.3 F

## 2024-09-03 RX ADMIN — Medication ONE MG: at 01:27

## 2024-09-03 RX ADMIN — LISINOPRIL SCH: 10 TABLET ORAL at 11:12

## 2024-09-03 RX ADMIN — AMLODIPINE BESYLATE SCH: 10 TABLET ORAL at 11:12

## 2024-09-03 RX ADMIN — FUROSEMIDE SCH: 20 TABLET ORAL at 11:13
